# Patient Record
Sex: MALE | Race: WHITE | Employment: FULL TIME | URBAN - METROPOLITAN AREA
[De-identification: names, ages, dates, MRNs, and addresses within clinical notes are randomized per-mention and may not be internally consistent; named-entity substitution may affect disease eponyms.]

---

## 2017-12-18 PROBLEM — N41.1 CHRONIC PROSTATITIS: Status: ACTIVE | Noted: 2017-12-18

## 2018-01-24 PROBLEM — E78.2 MIXED HYPERLIPIDEMIA: Status: ACTIVE | Noted: 2018-01-24

## 2018-01-24 PROBLEM — I10 ESSENTIAL HYPERTENSION: Status: ACTIVE | Noted: 2018-01-24

## 2018-01-24 PROBLEM — F10.20 UNCOMPLICATED ALCOHOL DEPENDENCE (HCC): Status: ACTIVE | Noted: 2018-01-24

## 2018-01-24 PROBLEM — M94.0 COSTOCHONDRITIS: Status: ACTIVE | Noted: 2018-01-24

## 2018-01-24 PROBLEM — F41.9 ANXIETY: Status: ACTIVE | Noted: 2018-01-24

## 2018-01-27 PROCEDURE — 81003 URINALYSIS AUTO W/O SCOPE: CPT | Performed by: FAMILY MEDICINE

## 2018-01-27 PROCEDURE — 82746 ASSAY OF FOLIC ACID SERUM: CPT | Performed by: FAMILY MEDICINE

## 2018-01-27 PROCEDURE — 82607 VITAMIN B-12: CPT | Performed by: FAMILY MEDICINE

## 2018-05-06 ENCOUNTER — NURSE ONLY (OUTPATIENT)
Dept: FAMILY MEDICINE CLINIC | Facility: CLINIC | Age: 48
End: 2018-05-06

## 2018-05-06 VITALS
RESPIRATION RATE: 16 BRPM | TEMPERATURE: 98 F | OXYGEN SATURATION: 97 % | SYSTOLIC BLOOD PRESSURE: 130 MMHG | WEIGHT: 178 LBS | HEIGHT: 70 IN | DIASTOLIC BLOOD PRESSURE: 64 MMHG | HEART RATE: 68 BPM | BODY MASS INDEX: 25.48 KG/M2

## 2018-05-06 DIAGNOSIS — L30.9 ECZEMA, UNSPECIFIED TYPE: Primary | ICD-10-CM

## 2018-05-06 PROCEDURE — 99213 OFFICE O/P EST LOW 20 MIN: CPT | Performed by: NURSE PRACTITIONER

## 2018-05-06 RX ORDER — TAMSULOSIN HYDROCHLORIDE 0.4 MG/1
0.4 CAPSULE ORAL NIGHTLY
COMMUNITY
End: 2019-06-27 | Stop reason: ALTCHOICE

## 2018-05-06 NOTE — PROGRESS NOTES
CHIEF COMPLAINT:   Patient presents with:  Eczema: x1 week, feet, under arms, forehead     HPI:   Fabiana Chacko is a 51year old male who presents for evaluation of a rash. Per patient rash started in the past 7 days.  Rash has been worsening since onset LYMPH: Denies enlargement of the lymph nodes. MUSC/SKEL: Denies joint swelling or joint stiffness. GI: Denies abdominal pain, N/V/C/D. NEURO: Denies abnormal sensation, tingling of the skin, or numbness.     EXAM:   /64   Pulse 68   Temp 98.1 °F (3 Managing Atopic Dermatitis (Eczema)     After bathing, gently pat your skin dry (don’t rub). Apply moisturizer while your skin is still damp.    To manage your symptoms and help reduce the severity and frequency, try these self-care tips:  Caring for your s Now that you know more about atopic dermatitis, the next step is up to you. Follow your healthcare provider’s treatment plan and your self-care routine. This will help bring atopic dermatitis under control.  If your symptoms persist, be sure to let your hea

## 2018-05-06 NOTE — PATIENT INSTRUCTIONS
Managing Atopic Dermatitis (Eczema)     After bathing, gently pat your skin dry (don’t rub). Apply moisturizer while your skin is still damp.    To manage your symptoms and help reduce the severity and frequency, try these self-care tips:  Caring for your Now that you know more about atopic dermatitis, the next step is up to you. Follow your healthcare provider’s treatment plan and your self-care routine. This will help bring atopic dermatitis under control.  If your symptoms persist, be sure to let your hea

## 2019-01-21 PROCEDURE — 81003 URINALYSIS AUTO W/O SCOPE: CPT | Performed by: FAMILY MEDICINE

## 2021-01-18 ENCOUNTER — HOSPITAL ENCOUNTER (EMERGENCY)
Age: 51
Discharge: HOME | End: 2021-01-18
Payer: COMMERCIAL

## 2021-01-18 ENCOUNTER — APPOINTMENT (OUTPATIENT)
Dept: GENERAL RADIOLOGY | Age: 51
End: 2021-01-18
Payer: COMMERCIAL

## 2021-01-18 VITALS
WEIGHT: 190 LBS | SYSTOLIC BLOOD PRESSURE: 163 MMHG | HEART RATE: 84 BPM | BODY MASS INDEX: 27.2 KG/M2 | TEMPERATURE: 99 F | HEIGHT: 70 IN | DIASTOLIC BLOOD PRESSURE: 87 MMHG | OXYGEN SATURATION: 97 % | RESPIRATION RATE: 16 BRPM

## 2021-01-18 DIAGNOSIS — S52.572A OTHER CLOSED INTRA-ARTICULAR FRACTURE OF DISTAL END OF LEFT RADIUS, INITIAL ENCOUNTER: Primary | ICD-10-CM

## 2021-01-18 PROCEDURE — 29125 APPL SHORT ARM SPLINT STATIC: CPT

## 2021-01-18 PROCEDURE — 73110 X-RAY EXAM OF WRIST: CPT

## 2021-01-18 PROCEDURE — 99284 EMERGENCY DEPT VISIT MOD MDM: CPT

## 2021-01-18 NOTE — ED PROVIDER NOTES
Patient Seen in: THE [facility] Emergency Department In [place]      History   Patient presents with:  Arm or Hand Injury    Stated Complaint: fell last night and injured left wrist    HPI/Subjective:   HPI    Laney Hart is a 42-year-old male who presents today expansion  Cardio: RRR, no murmurs/clicks/rubs, capillary refill brisk, normal distal pulses  Pulmonary: Normal respirations, lungs CTA  Musculoskeletal: Significant swelling to the left hand with some ecchymosis over the dorsum of the hand.   No tenderness neurovascularly intact distal to the splint. MDM      Patient is informed of his imaging findings. A splint is placed as noted above. He declines sling, as he has one at home. He is instructed to follow-up with Dr. Sharlene Berg, [facility] orthopedics.   He